# Patient Record
Sex: FEMALE | Race: ASIAN | NOT HISPANIC OR LATINO | URBAN - METROPOLITAN AREA
[De-identification: names, ages, dates, MRNs, and addresses within clinical notes are randomized per-mention and may not be internally consistent; named-entity substitution may affect disease eponyms.]

---

## 2022-09-18 ENCOUNTER — EMERGENCY (EMERGENCY)
Facility: HOSPITAL | Age: 67
LOS: 1 days | Discharge: HOME | End: 2022-09-18
Attending: EMERGENCY MEDICINE
Payer: COMMERCIAL

## 2022-09-18 VITALS
TEMPERATURE: 98 F | SYSTOLIC BLOOD PRESSURE: 126 MMHG | HEIGHT: 65.5 IN | OXYGEN SATURATION: 98 % | RESPIRATION RATE: 16 BRPM | WEIGHT: 179.9 LBS | HEART RATE: 74 BPM | DIASTOLIC BLOOD PRESSURE: 79 MMHG

## 2022-09-18 VITALS
DIASTOLIC BLOOD PRESSURE: 79 MMHG | OXYGEN SATURATION: 97 % | RESPIRATION RATE: 18 BRPM | TEMPERATURE: 98 F | HEART RATE: 64 BPM | SYSTOLIC BLOOD PRESSURE: 121 MMHG

## 2022-09-18 LAB
ALBUMIN SERPL ELPH-MCNC: 3.9 G/DL — SIGNIFICANT CHANGE UP (ref 3.3–5)
ALP SERPL-CCNC: 89 U/L — SIGNIFICANT CHANGE UP (ref 40–120)
ALT FLD-CCNC: 18 U/L — SIGNIFICANT CHANGE UP (ref 10–45)
ANION GAP SERPL CALC-SCNC: 12 MMOL/L — SIGNIFICANT CHANGE UP (ref 5–17)
AST SERPL-CCNC: 26 U/L — SIGNIFICANT CHANGE UP (ref 10–40)
BASOPHILS # BLD AUTO: 0.02 K/UL — SIGNIFICANT CHANGE UP (ref 0–0.2)
BASOPHILS NFR BLD AUTO: 0.2 % — SIGNIFICANT CHANGE UP (ref 0–2)
BILIRUB SERPL-MCNC: 0.6 MG/DL — SIGNIFICANT CHANGE UP (ref 0.2–1.2)
BUN SERPL-MCNC: 15 MG/DL — SIGNIFICANT CHANGE UP (ref 7–23)
CALCIUM SERPL-MCNC: 9.8 MG/DL — SIGNIFICANT CHANGE UP (ref 8.4–10.5)
CHLORIDE SERPL-SCNC: 105 MMOL/L — SIGNIFICANT CHANGE UP (ref 96–108)
CO2 SERPL-SCNC: 23 MMOL/L — SIGNIFICANT CHANGE UP (ref 22–31)
CREAT SERPL-MCNC: 0.64 MG/DL — SIGNIFICANT CHANGE UP (ref 0.5–1.3)
CRP SERPL-MCNC: 12 MG/L — HIGH (ref 0–4)
EGFR: 96 ML/MIN/1.73M2 — SIGNIFICANT CHANGE UP
EOSINOPHIL # BLD AUTO: 1.35 K/UL — HIGH (ref 0–0.5)
EOSINOPHIL NFR BLD AUTO: 15.3 % — HIGH (ref 0–6)
ERYTHROCYTE [SEDIMENTATION RATE] IN BLOOD: 58 MM/HR — HIGH (ref 0–20)
GLUCOSE SERPL-MCNC: 74 MG/DL — SIGNIFICANT CHANGE UP (ref 70–99)
HCT VFR BLD CALC: 42.8 % — SIGNIFICANT CHANGE UP (ref 34.5–45)
HGB BLD-MCNC: 13.4 G/DL — SIGNIFICANT CHANGE UP (ref 11.5–15.5)
IMM GRANULOCYTES NFR BLD AUTO: 0.5 % — SIGNIFICANT CHANGE UP (ref 0–0.9)
LYMPHOCYTES # BLD AUTO: 2.15 K/UL — SIGNIFICANT CHANGE UP (ref 1–3.3)
LYMPHOCYTES # BLD AUTO: 24.3 % — SIGNIFICANT CHANGE UP (ref 13–44)
MCHC RBC-ENTMCNC: 26.4 PG — LOW (ref 27–34)
MCHC RBC-ENTMCNC: 31.3 GM/DL — LOW (ref 32–36)
MCV RBC AUTO: 84.4 FL — SIGNIFICANT CHANGE UP (ref 80–100)
MONOCYTES # BLD AUTO: 0.68 K/UL — SIGNIFICANT CHANGE UP (ref 0–0.9)
MONOCYTES NFR BLD AUTO: 7.7 % — SIGNIFICANT CHANGE UP (ref 2–14)
NEUTROPHILS # BLD AUTO: 4.61 K/UL — SIGNIFICANT CHANGE UP (ref 1.8–7.4)
NEUTROPHILS NFR BLD AUTO: 52 % — SIGNIFICANT CHANGE UP (ref 43–77)
NRBC # BLD: 0 /100 WBCS — SIGNIFICANT CHANGE UP (ref 0–0)
PLATELET # BLD AUTO: 235 K/UL — SIGNIFICANT CHANGE UP (ref 150–400)
POTASSIUM SERPL-MCNC: 5.1 MMOL/L — SIGNIFICANT CHANGE UP (ref 3.5–5.3)
POTASSIUM SERPL-SCNC: 5.1 MMOL/L — SIGNIFICANT CHANGE UP (ref 3.5–5.3)
PROT SERPL-MCNC: 7.2 G/DL — SIGNIFICANT CHANGE UP (ref 6–8.3)
RBC # BLD: 5.07 M/UL — SIGNIFICANT CHANGE UP (ref 3.8–5.2)
RBC # FLD: 12.5 % — SIGNIFICANT CHANGE UP (ref 10.3–14.5)
SARS-COV-2 RNA SPEC QL NAA+PROBE: SIGNIFICANT CHANGE UP
SODIUM SERPL-SCNC: 140 MMOL/L — SIGNIFICANT CHANGE UP (ref 135–145)
WBC # BLD: 8.85 K/UL — SIGNIFICANT CHANGE UP (ref 3.8–10.5)
WBC # FLD AUTO: 8.85 K/UL — SIGNIFICANT CHANGE UP (ref 3.8–10.5)

## 2022-09-18 PROCEDURE — 85652 RBC SED RATE AUTOMATED: CPT

## 2022-09-18 PROCEDURE — 99284 EMERGENCY DEPT VISIT MOD MDM: CPT | Mod: 25

## 2022-09-18 PROCEDURE — 80053 COMPREHEN METABOLIC PANEL: CPT

## 2022-09-18 PROCEDURE — U0003: CPT

## 2022-09-18 PROCEDURE — U0005: CPT

## 2022-09-18 PROCEDURE — 85025 COMPLETE CBC W/AUTO DIFF WBC: CPT

## 2022-09-18 PROCEDURE — 86140 C-REACTIVE PROTEIN: CPT

## 2022-09-18 PROCEDURE — 36415 COLL VENOUS BLD VENIPUNCTURE: CPT

## 2022-09-18 PROCEDURE — 99284 EMERGENCY DEPT VISIT MOD MDM: CPT

## 2022-09-18 PROCEDURE — 73620 X-RAY EXAM OF FOOT: CPT | Mod: 26,LT

## 2022-09-18 PROCEDURE — 73620 X-RAY EXAM OF FOOT: CPT

## 2022-09-18 RX ORDER — AMPICILLIN SODIUM AND SULBACTAM SODIUM 250; 125 MG/ML; MG/ML
1.5 INJECTION, POWDER, FOR SUSPENSION INTRAMUSCULAR; INTRAVENOUS ONCE
Refills: 0 | Status: DISCONTINUED | OUTPATIENT
Start: 2022-09-18 | End: 2022-09-18

## 2022-09-18 NOTE — ED PROVIDER NOTE - NS ED ROS FT
Gen: Denies fevers  CV: Denies chest pain  Skin: + foot wound  Resp: Denies SOB, cough  Endo: Denies increased urination  GI: Denies nausea, vomiting  Msk: +foot pain  : Denies dysuria  Neuro: Denies LOC  Psych: Denies mood changes  all other ROS negative unless indicated in HPI

## 2022-09-18 NOTE — ED PROVIDER NOTE - CARE PROVIDER_API CALL
Waterhouse, Joseph C (DPM)  Surgery  [address] [place] [address]  Phone: [phone]  Fax: [phone]  Follow Up Time:

## 2022-09-18 NOTE — CONSULT NOTE ADULT - ASSESSMENT
67F presents with left dorsal lateral midfoot wound to subdermis.  - Pt seen and evaluated.  - Afebrile, WBC pending, ESR pending, CRP pending.  - Left Foot X-Ray: No gas, no OM, no foreign body.  - Left foot dorsal lateral midfoot wound to subdermis, no drainage, no pus, no excess warmth, no erythema, periwound discoloration, no clinical SOI. Right foot no wounds, no clinical SOI.  - Due to no clinical SOI, a left foot culture is not indicated.  - If WBC is WNL, pt may be discharged on PO antibiotics (recommend PO doxycycline and PO augmentin).  - If WBC is elevated, pt may be placed in CDU for IV antibiotics (recommend IV unasyn).  - Pt may followup with Dr. Joseph Waterhouse ([phone]) within 1 week.  - Discussed with attending. 67F presents with left dorsal lateral midfoot wound to subdermis.  - Pt seen and evaluated.  - Afebrile, WBC 8.85, ESR pending, CRP pending.  - Left Foot X-Ray: No gas, no OM, no foreign body.  - Left foot dorsal lateral midfoot wound to subdermis, no drainage, no pus, no excess warmth, no erythema, periwound discoloration, no clinical SOI. Right foot no wounds, no clinical SOI.  - Due to no clinical SOI, a left foot culture is not indicated.  - If WBC is WNL, pt may be discharged on her current PO antibiotics (recommend continuing with PO doxycycline and adding PO keflex).  - Pt may followup with Dr. Joseph Waterhouse ([phone]) within 1 week.  - Discussed with attending. 67F presents with left dorsal lateral midfoot wound to subdermis.  - Pt seen and evaluated.  - Afebrile, WBC 8.85, ESR pending, CRP pending.  - Left Foot X-Ray: No gas, no OM, no foreign body.  - Left foot dorsal lateral midfoot wound to subdermis, no drainage, no pus, no excess warmth, no erythema, periwound discoloration, no clinical SOI. Right foot no wounds, no clinical SOI.  - Due to no clinical SOI, a left foot culture is not indicated.  - Pt may be discharged on her current PO antibiotics (recommend continuing with PO doxycycline and adding PO keflex).  - Pt may followup with Dr. Joseph Waterhouse ([phone]) within 1 week.  - Discussed with attending. 67F presents with left dorsal lateral midfoot wound to subdermis.  - Pt seen and evaluated.  - Afebrile, WBC 8.85, ESR pending, CRP pending.  - Left Foot X-Ray: No gas, no OM, no foreign body.  - Left foot dorsal lateral midfoot wound to subdermis, no drainage, no pus, no excess warmth, no erythema, periwound discoloration, no clinical SOI. Right foot no wounds, no clinical SOI.  - Due to no clinical SOI, a left foot culture is not indicated.  - Pt may be discharged on her current PO antibiotics (recommend continuing with PO doxycycline and adding PO keflex).  - Pt may followup with Dr. Joseph Waterhouse ([phone]) within 1 week (recommend Friday 9/23).  - Discussed with attending.

## 2022-09-18 NOTE — ED PROVIDER NOTE - NSFOLLOWUPINSTRUCTIONS_ED_ALL_ED_FT
No signs of emergency medical condition on today's workup.  Presumptive diagnosis made, but further evaluation may be required by your primary care doctor or specialist for a definitive diagnosis.  Therefore, follow up as directed and if symptoms change/worsen or any emergency conditions, please return to the ER.    Please follow up with podiatrist given.       A diabetic foot ulcer can be redness over a bony area or an open sore. The ulcer can develop anywhere on your foot or toes. Ulcers usually develop on the bottom of the foot. You may not know you have an ulcer until you notice drainage on your sock. Drainage is fluid that may be yellow, brown, or red. The fluid may also contain pus or blood.  Foot Ulcers         DISCHARGE INSTRUCTIONS:    Call your local emergency number (911 in the ) if:   •You have a fever with chills.      •You begin vomiting.      •You feel faint or become confused.      Seek care immediately if:   •You see new drainage on your sock.      •Your foot becomes red, warm, and swollen.      •Your foot ulcer has a bad smell or is draining pus.      •You feel pain in a foot that used to have little or no feeling.      •You see black or dead tissue in or around the ulcer.      Call your diabetes care provider or foot specialist if:   •The ulcer becomes bigger, deeper, or does not heal.      •You have questions or concerns about your condition or care.      Medicines:   •Antibiotics may be given to help treat or prevent a bacterial infection.      •Take your medicine as directed. Contact your healthcare provider if you think your medicine is not helping or if you have side effects. Tell your provider if you are allergic to any medicine. Keep a list of the medicines, vitamins, and herbs you take. Include the amounts, and when and why you take them. Bring the list or the pill bottles to follow-up visits. Carry your medicine list with you in case of an emergency.      Care for the ulcer as directed: A bandage will be put on the ulcer. Your healthcare provider will give you instructions on changing your bandage. You may need to clean the ulcer and change the bandage daily. The bandage may contain medicines to help the ulcer heal. You may be asked to put medicine on the ulcer before putting on the bandage. The medicine may also prevent growth of tissue that is not healthy. You may need to cover the ulcer with a plastic bag while you bathe. Ask your healthcare provider for instructions on bathing until your foot heals.    Prevent diabetic foot ulcers: Good foot care may help prevent ulcers, or keep them from getting worse. Ask someone to help you if you are not able to check your feet by yourself. You or another person may need to do any of the following:  •Keep your blood sugar levels under control. Continue the plan for your diabetes that you and your healthcare provider have discussed. Healthy food choices and taking your medicines as directed may help control blood sugars. Contact your healthcare provider if your blood sugar levels are higher than directed.      •Wash your feet each day with soap and warm water. Do not use hot water, because this can injure your foot. Dry your feet gently with a towel after you wash them. Dry between and under your toes.      •Apply lotion or a moisturizer on your dry feet. Ask your healthcare provider what lotions are best to use. Do not put lotion or moisturizer between your toes. Moisture between your toes could lead to skin breakdown.      •Check your feet each day. Look at your whole foot, including the bottom, and between and under your toes. Check for wounds, corns, and calluses. Feel your feet by running your hands along the tops, bottoms, sides, and between your toes. Use a nonbreakable mirror to check your feet if you have trouble seeing the bottoms. Do not try to remove corns or calluses yourself. File or cut your toenails straight across.  Diabetic Foot Care           •Protect your feet. Do not walk barefoot or wear your shoes without socks. Check your shoes for rocks or other objects that can hurt your feet. Wear cotton socks to help keep your feet dry. Wear socks without toe seams, or wear them with the seams inside out. Change your socks each day. Do not wear socks that are dirty or damp.      •Wear shoes that fit well. Wear shoes that do not rub against any area of your feet. Your shoes should be ½ to ¾ inch (1 to 2 centimeters) longer than your feet. Your shoes should also have extra space around the widest part of your feet. Walking or athletic shoes with laces or straps that adjust are best. Ask your healthcare provider for help to choose shoes that fit you best. Ask him or her if you need to wear an insert, orthotic, or bandage on your feet.  Properly Fitting Shoes           •Do not smoke. Nicotine can cause damage to your blood vessels and increases your risk for foot ulcers. Do not use e-cigarettes or smokeless tobacco in place of cigarettes or to help you quit. They still contain nicotine. Ask your healthcare provider for information if you currently smoke and need help quitting.      •Know the risks if you choose to drink alcohol. Alcohol can cause your blood sugar levels to be low if you use insulin. Alcohol can cause high blood sugar levels and weight gain if you drink too much. A drink of alcohol is 12 ounces of beer, 5 ounces of wine, or 1½ ounces of liquor.      •Maintain a healthy weight. Ask your healthcare provider what a healthy weight is for you. A healthy weight can help you control your diabetes. Ask him or her to help you create a weight loss plan if needed. Even a 10 to 15 pound weight loss can help you manage your blood sugar level.      Follow up with your diabetes care provider or foot specialist as directed: You may need to return often to have the ulcer checked. The ulcer may be measured to see if it is getting smaller. Bring any offloading devices or footwear to your follow-up visits so your provider or specialist can check them. Write down your questions so you remember to ask them during your visits

## 2022-09-18 NOTE — CONSULT NOTE ADULT - SUBJECTIVE AND OBJECTIVE BOX
Patient is a 67y old  Female who presents with a chief complaint of left foot wound.    HPI: 67 F PMH DMII, HLD, Hypothyroid presents to ED with worsening Lt foot infection. Pt noticed "a bite" 1 month ago that she was scratching at, ignored it until 1 week ago when it became red and painful with central ulceration. Went to PCP and was started on Keflex, took for 5 days and was not seeing improvement. Then went to urgent care and was started on doxycylcine. Now taking both abx. Urgent care told her if she noticed any increased redness after starting abx to go to ED. Pt woke this morning and felt the redness was worse. She has intermittent pain in the area that is not related to walking. tenderness with direct pressure. Central ulceration no longer draining any fluid. Denies fever, HA, CP,SOB, wound drainage, numbness of feet.      PAST MEDICAL & SURGICAL HISTORY:  HLD (hyperlipidemia)      DM (diabetes mellitus)      Hypothyroid      No significant past surgical history          MEDICATIONS  (STANDING):    MEDICATIONS  (PRN):      Allergies    No Known Allergies    Intolerances        VITALS:    Vital Signs Last 24 Hrs  T(C): 36.9 (18 Sep 2022 11:42), Max: 36.9 (18 Sep 2022 11:42)  T(F): 98.4 (18 Sep 2022 11:42), Max: 98.4 (18 Sep 2022 11:42)  HR: 74 (18 Sep 2022 11:42) (74 - 74)  BP: 126/79 (18 Sep 2022 11:42) (126/79 - 126/79)  BP(mean): --  RR: 16 (18 Sep 2022 11:42) (16 - 16)  SpO2: 98% (18 Sep 2022 11:42) (98% - 98%)    Parameters below as of 18 Sep 2022 11:42  Patient On (Oxygen Delivery Method): room air        LABS:                CAPILLARY BLOOD GLUCOSE              LOWER EXTREMITY PHYSICAL EXAM:    Vascular: DP/PT 2/4, B/L, CFT <3 seconds B/L, Temperature gradient warm to cool, B/L.   Neuro: Epicritic sensation intact to the level of toes, B/L.  Musculoskeletal/Ortho: Unremarkable  Skin: Left foot dorsal lateral midfoot wound to subdermis, no drainage, no pus, no excess warmth, no erythema, periwound discoloration, no clinical SOI. Right foot no wounds, no clinical SOI.      RADIOLOGY & ADDITIONAL STUDIES:

## 2022-09-18 NOTE — ED PROVIDER NOTE - PROGRESS NOTE DETAILS
Garrett Alfred MD, PGY1  Consulted podiatry. They will come see patient. Garrett Alfred MD, PGY1  Podiatry consult: recommend keep on oral antibiotics. No need for IV abx. Will follow up with them. Garrett Alfred MD, PGY1  Labs normal. Will dc home with podiatry follow up. continue abx as prescribed.

## 2022-09-18 NOTE — ED PROVIDER NOTE - CLINICAL SUMMARY MEDICAL DECISION MAKING FREE TEXT BOX
67 F PMH DM presenting with no healing foot ulceration and redness x 1 month. On Keflex x 3 days and Doxy x 1 without any improvement. Probably bug bite that was infected when scratching. No systemic sx. Neurovascularly intact on exam with area of central ulceration and surrounding darker pigmentation, crusted over. Will get labs, xray foot, abx IV, podiatry consult. Reassess. 67 F PMH DM presenting with ulceration to dorsal surface L foot initially present as "bite" ~1 month ago and ulcerated ~1 week ago. On Keflex x 3 days and Doxy x 1 without any signif improvement. Possibly insect bite that was infected when scratching. No systemic sx. ie fever, chills. Neurovascularly intact on exam with area of central ulceration and surrounding darker pigmentation w dry scaly skin. No warmthor drainage. NO signif TTP. Will get labs, xray foot, abx IV, podiatry consult. Reassess, poss home after dose of IV w good FU vs CDU for addtl IV abx.

## 2022-09-18 NOTE — ED PROVIDER NOTE - PHYSICAL EXAMINATION
Gen: NAD  HEENT: EOMI, no nasal discharge, mucous membranes moist  CV: RRR, +S1/S2, no M/R/G, 2+ radial pulses b/l  Resp: CTAB, no W/R/R, no accessory muscle use, no increased work of breathing  GI: Abdomen soft non-distended, NTTP  Skin: 1 cm central ulceration not active draining over dorsum of left foot with surrounding erythema that is mildly tender to palpation. Neurovascularly   Neuro: A&Ox4, following commands, moving all four extremities spontaneously  Psych: appropriate mood Gen: NAD  HEENT: EOMI, no nasal discharge, mucous membranes moist  CV: RRR, +S1/S2, no M/R/G, 2+ radial pulses b/l  Resp: CTAB, no W/R/R, no accessory muscle use, no increased work of breathing  GI: Abdomen soft non-distended, NTTP  Skin: 1 cm central ulceration not active draining over dorsum of left foot with surrounding erythema that is mildly tender to palpation. Neurovascularly intact.   Neuro: moving all four extremities spontaneously  Psych: appropriate mood

## 2022-09-18 NOTE — ED ADULT NURSE NOTE - OBJECTIVE STATEMENT
As per Pt, regular diet & thin liquids
67 year old female c/o left foot wound x1 month. Pt A+Ox3, reports visiting PCP last week and starting Keflex, worsening this week and started Doxycycline from Urgent Care. Pt reports redness around wound is spreading, bringing her to the ER. Upon assessment, pt presents with left foot dorsal diabetic ulcer. Pt able to ambulate appropriately. Pt also c/o lower abdominal cramping and headache for a few days. Denies dizziness, chest pain, SOB, N/V, urinary or bowel symptoms, fevers or chills.

## 2022-09-18 NOTE — ED PROVIDER NOTE - OBJECTIVE STATEMENT
67 F PMH DMII, HLD, Hypothyroid presents to ED with worsening Lt foot infection. Pt noticed "a bite" 1 month ago that she was scratching at, ignored it until 1 week ago when it became red and painful with central ulceration. Went to PCP and was started on Keflex, took for 5 days and was not seeing improvement. Then went to urgent care and was started on doxycylcine. Now taking both abx. Urgent care told her if she noticed any increased redness after starting abx to go to ED. Pt woke this morning and felt the redness was worse. She has intermittent pain in the area that is not related to walking. tenderness with direct pressure. Central ulceration no longer draining any fluid. Denies fever, HA, CP,SOB, wound drainage, numbness of feet.

## 2023-05-12 ENCOUNTER — INPATIENT (INPATIENT)
Facility: HOSPITAL | Age: 68
LOS: 1 days | Discharge: HOME | DRG: 287 | End: 2023-05-14
Attending: HOSPITALIST | Admitting: STUDENT IN AN ORGANIZED HEALTH CARE EDUCATION/TRAINING PROGRAM
Payer: COMMERCIAL

## 2023-05-12 VITALS
OXYGEN SATURATION: 99 % | WEIGHT: 177.91 LBS | SYSTOLIC BLOOD PRESSURE: 147 MMHG | RESPIRATION RATE: 16 BRPM | TEMPERATURE: 98 F | HEART RATE: 67 BPM | DIASTOLIC BLOOD PRESSURE: 85 MMHG | HEIGHT: 60 IN

## 2023-05-12 PROCEDURE — 99285 EMERGENCY DEPT VISIT HI MDM: CPT

## 2023-05-12 NOTE — ED ADULT TRIAGE NOTE - CHIEF COMPLAINT QUOTE
chest pain x3 days, worsening tonight. Pt had abnormal stress test 2 weeks ago and is scheduled for a cath on thursday but pain is worsening and radiating to the back

## 2023-05-13 DIAGNOSIS — Z29.9 ENCOUNTER FOR PROPHYLACTIC MEASURES, UNSPECIFIED: ICD-10-CM

## 2023-05-13 DIAGNOSIS — E78.5 HYPERLIPIDEMIA, UNSPECIFIED: ICD-10-CM

## 2023-05-13 DIAGNOSIS — E03.9 HYPOTHYROIDISM, UNSPECIFIED: ICD-10-CM

## 2023-05-13 DIAGNOSIS — I10 ESSENTIAL (PRIMARY) HYPERTENSION: ICD-10-CM

## 2023-05-13 DIAGNOSIS — R07.9 CHEST PAIN, UNSPECIFIED: ICD-10-CM

## 2023-05-13 DIAGNOSIS — E11.9 TYPE 2 DIABETES MELLITUS WITHOUT COMPLICATIONS: ICD-10-CM

## 2023-05-13 DIAGNOSIS — I20.0 UNSTABLE ANGINA: ICD-10-CM

## 2023-05-13 LAB
ALBUMIN SERPL ELPH-MCNC: 4.7 G/DL — SIGNIFICANT CHANGE UP (ref 3.3–5)
ALP SERPL-CCNC: 75 U/L — SIGNIFICANT CHANGE UP (ref 40–120)
ALT FLD-CCNC: 18 U/L — SIGNIFICANT CHANGE UP (ref 10–45)
ANION GAP SERPL CALC-SCNC: 11 MMOL/L — SIGNIFICANT CHANGE UP (ref 5–17)
APTT BLD: 35.6 SEC — HIGH (ref 27.5–35.5)
AST SERPL-CCNC: 20 U/L — SIGNIFICANT CHANGE UP (ref 10–40)
BASOPHILS # BLD AUTO: 0.03 K/UL — SIGNIFICANT CHANGE UP (ref 0–0.2)
BASOPHILS NFR BLD AUTO: 0.4 % — SIGNIFICANT CHANGE UP (ref 0–2)
BILIRUB SERPL-MCNC: 0.5 MG/DL — SIGNIFICANT CHANGE UP (ref 0.2–1.2)
BUN SERPL-MCNC: 14 MG/DL — SIGNIFICANT CHANGE UP (ref 7–23)
CALCIUM SERPL-MCNC: 9.5 MG/DL — SIGNIFICANT CHANGE UP (ref 8.4–10.5)
CHLORIDE SERPL-SCNC: 106 MMOL/L — SIGNIFICANT CHANGE UP (ref 96–108)
CO2 SERPL-SCNC: 24 MMOL/L — SIGNIFICANT CHANGE UP (ref 22–31)
CREAT SERPL-MCNC: 0.7 MG/DL — SIGNIFICANT CHANGE UP (ref 0.5–1.3)
EGFR: 93 ML/MIN/1.73M2 — SIGNIFICANT CHANGE UP
EOSINOPHIL # BLD AUTO: 0.35 K/UL — SIGNIFICANT CHANGE UP (ref 0–0.5)
EOSINOPHIL NFR BLD AUTO: 4.1 % — SIGNIFICANT CHANGE UP (ref 0–6)
GLUCOSE BLDC GLUCOMTR-MCNC: 111 MG/DL — HIGH (ref 70–99)
GLUCOSE BLDC GLUCOMTR-MCNC: 136 MG/DL — HIGH (ref 70–99)
GLUCOSE SERPL-MCNC: 83 MG/DL — SIGNIFICANT CHANGE UP (ref 70–99)
HCT VFR BLD CALC: 40.4 % — SIGNIFICANT CHANGE UP (ref 34.5–45)
HGB BLD-MCNC: 12.4 G/DL — SIGNIFICANT CHANGE UP (ref 11.5–15.5)
IMM GRANULOCYTES NFR BLD AUTO: 0.4 % — SIGNIFICANT CHANGE UP (ref 0–0.9)
INR BLD: 0.94 RATIO — SIGNIFICANT CHANGE UP (ref 0.88–1.16)
LYMPHOCYTES # BLD AUTO: 3.21 K/UL — SIGNIFICANT CHANGE UP (ref 1–3.3)
LYMPHOCYTES # BLD AUTO: 37.6 % — SIGNIFICANT CHANGE UP (ref 13–44)
MAGNESIUM SERPL-MCNC: 2.1 MG/DL — SIGNIFICANT CHANGE UP (ref 1.6–2.6)
MCHC RBC-ENTMCNC: 26.2 PG — LOW (ref 27–34)
MCHC RBC-ENTMCNC: 30.7 GM/DL — LOW (ref 32–36)
MCV RBC AUTO: 85.4 FL — SIGNIFICANT CHANGE UP (ref 80–100)
MONOCYTES # BLD AUTO: 0.54 K/UL — SIGNIFICANT CHANGE UP (ref 0–0.9)
MONOCYTES NFR BLD AUTO: 6.3 % — SIGNIFICANT CHANGE UP (ref 2–14)
NEUTROPHILS # BLD AUTO: 4.38 K/UL — SIGNIFICANT CHANGE UP (ref 1.8–7.4)
NEUTROPHILS NFR BLD AUTO: 51.2 % — SIGNIFICANT CHANGE UP (ref 43–77)
NRBC # BLD: 0 /100 WBCS — SIGNIFICANT CHANGE UP (ref 0–0)
NT-PROBNP SERPL-SCNC: 46 PG/ML — SIGNIFICANT CHANGE UP (ref 0–300)
PLATELET # BLD AUTO: 214 K/UL — SIGNIFICANT CHANGE UP (ref 150–400)
POTASSIUM SERPL-MCNC: 4.1 MMOL/L — SIGNIFICANT CHANGE UP (ref 3.5–5.3)
POTASSIUM SERPL-SCNC: 4.1 MMOL/L — SIGNIFICANT CHANGE UP (ref 3.5–5.3)
PROT SERPL-MCNC: 7.6 G/DL — SIGNIFICANT CHANGE UP (ref 6–8.3)
PROTHROM AB SERPL-ACNC: 10.8 SEC — SIGNIFICANT CHANGE UP (ref 10.5–13.4)
RBC # BLD: 4.73 M/UL — SIGNIFICANT CHANGE UP (ref 3.8–5.2)
RBC # FLD: 13.3 % — SIGNIFICANT CHANGE UP (ref 10.3–14.5)
SODIUM SERPL-SCNC: 141 MMOL/L — SIGNIFICANT CHANGE UP (ref 135–145)
TROPONIN T, HIGH SENSITIVITY RESULT: 6 NG/L — SIGNIFICANT CHANGE UP (ref 0–51)
WBC # BLD: 8.54 K/UL — SIGNIFICANT CHANGE UP (ref 3.8–10.5)
WBC # FLD AUTO: 8.54 K/UL — SIGNIFICANT CHANGE UP (ref 3.8–10.5)

## 2023-05-13 PROCEDURE — 93010 ELECTROCARDIOGRAM REPORT: CPT

## 2023-05-13 PROCEDURE — 71046 X-RAY EXAM CHEST 2 VIEWS: CPT | Mod: 26

## 2023-05-13 PROCEDURE — 99233 SBSQ HOSP IP/OBS HIGH 50: CPT

## 2023-05-13 RX ORDER — ASPIRIN/CALCIUM CARB/MAGNESIUM 324 MG
243 TABLET ORAL ONCE
Refills: 0 | Status: COMPLETED | OUTPATIENT
Start: 2023-05-13 | End: 2023-05-13

## 2023-05-13 RX ORDER — SODIUM CHLORIDE 9 MG/ML
1000 INJECTION, SOLUTION INTRAVENOUS
Refills: 0 | Status: DISCONTINUED | OUTPATIENT
Start: 2023-05-13 | End: 2023-05-14

## 2023-05-13 RX ORDER — SIMVASTATIN 20 MG/1
1 TABLET, FILM COATED ORAL
Refills: 0 | DISCHARGE

## 2023-05-13 RX ORDER — ACETAMINOPHEN 500 MG
975 TABLET ORAL ONCE
Refills: 0 | Status: COMPLETED | OUTPATIENT
Start: 2023-05-13 | End: 2023-05-13

## 2023-05-13 RX ORDER — LEVOTHYROXINE SODIUM 125 MCG
1 TABLET ORAL
Refills: 0 | DISCHARGE

## 2023-05-13 RX ORDER — GLUCAGON INJECTION, SOLUTION 0.5 MG/.1ML
1 INJECTION, SOLUTION SUBCUTANEOUS ONCE
Refills: 0 | Status: DISCONTINUED | OUTPATIENT
Start: 2023-05-13 | End: 2023-05-14

## 2023-05-13 RX ORDER — POLYETHYLENE GLYCOL 3350 17 G/17G
17 POWDER, FOR SOLUTION ORAL DAILY
Refills: 0 | Status: DISCONTINUED | OUTPATIENT
Start: 2023-05-13 | End: 2023-05-14

## 2023-05-13 RX ORDER — ASPIRIN/CALCIUM CARB/MAGNESIUM 324 MG
81 TABLET ORAL DAILY
Refills: 0 | Status: DISCONTINUED | OUTPATIENT
Start: 2023-05-13 | End: 2023-05-14

## 2023-05-13 RX ORDER — GLUCOSAMINE HCL/CHONDROITIN SU 500-400 MG
1 CAPSULE ORAL
Refills: 0 | DISCHARGE

## 2023-05-13 RX ORDER — ASPIRIN/CALCIUM CARB/MAGNESIUM 324 MG
1 TABLET ORAL
Refills: 0 | DISCHARGE

## 2023-05-13 RX ORDER — INSULIN LISPRO 100/ML
VIAL (ML) SUBCUTANEOUS
Refills: 0 | Status: DISCONTINUED | OUTPATIENT
Start: 2023-05-13 | End: 2023-05-14

## 2023-05-13 RX ORDER — DEXTROSE 50 % IN WATER 50 %
25 SYRINGE (ML) INTRAVENOUS ONCE
Refills: 0 | Status: DISCONTINUED | OUTPATIENT
Start: 2023-05-13 | End: 2023-05-14

## 2023-05-13 RX ORDER — INSULIN LISPRO 100/ML
VIAL (ML) SUBCUTANEOUS AT BEDTIME
Refills: 0 | Status: DISCONTINUED | OUTPATIENT
Start: 2023-05-13 | End: 2023-05-14

## 2023-05-13 RX ORDER — LEVOTHYROXINE SODIUM 125 MCG
25 TABLET ORAL DAILY
Refills: 0 | Status: DISCONTINUED | OUTPATIENT
Start: 2023-05-13 | End: 2023-05-14

## 2023-05-13 RX ORDER — PIOGLITAZONE HYDROCHLORIDE 15 MG/1
1 TABLET ORAL
Refills: 0 | DISCHARGE

## 2023-05-13 RX ORDER — SIMVASTATIN 20 MG/1
10 TABLET, FILM COATED ORAL AT BEDTIME
Refills: 0 | Status: DISCONTINUED | OUTPATIENT
Start: 2023-05-13 | End: 2023-05-14

## 2023-05-13 RX ORDER — ACETAMINOPHEN 500 MG
650 TABLET ORAL EVERY 6 HOURS
Refills: 0 | Status: DISCONTINUED | OUTPATIENT
Start: 2023-05-13 | End: 2023-05-14

## 2023-05-13 RX ORDER — ENOXAPARIN SODIUM 100 MG/ML
40 INJECTION SUBCUTANEOUS EVERY 24 HOURS
Refills: 0 | Status: DISCONTINUED | OUTPATIENT
Start: 2023-05-13 | End: 2023-05-14

## 2023-05-13 RX ORDER — LIDOCAINE 4 G/100G
1 CREAM TOPICAL ONCE
Refills: 0 | Status: COMPLETED | OUTPATIENT
Start: 2023-05-13 | End: 2023-05-13

## 2023-05-13 RX ORDER — METOPROLOL TARTRATE 50 MG
25 TABLET ORAL DAILY
Refills: 0 | Status: DISCONTINUED | OUTPATIENT
Start: 2023-05-13 | End: 2023-05-14

## 2023-05-13 RX ORDER — METOPROLOL TARTRATE 50 MG
1 TABLET ORAL
Refills: 0 | DISCHARGE

## 2023-05-13 RX ORDER — CYCLOSPORINE 0.5 MG/ML
1 EMULSION OPHTHALMIC
Refills: 0 | DISCHARGE

## 2023-05-13 RX ORDER — DEXTROSE 50 % IN WATER 50 %
15 SYRINGE (ML) INTRAVENOUS ONCE
Refills: 0 | Status: DISCONTINUED | OUTPATIENT
Start: 2023-05-13 | End: 2023-05-14

## 2023-05-13 RX ORDER — GLIMEPIRIDE 1 MG
1 TABLET ORAL
Refills: 0 | DISCHARGE

## 2023-05-13 RX ORDER — DEXTROSE 50 % IN WATER 50 %
12.5 SYRINGE (ML) INTRAVENOUS ONCE
Refills: 0 | Status: DISCONTINUED | OUTPATIENT
Start: 2023-05-13 | End: 2023-05-14

## 2023-05-13 RX ADMIN — SIMVASTATIN 10 MILLIGRAM(S): 20 TABLET, FILM COATED ORAL at 21:58

## 2023-05-13 RX ADMIN — Medication 243 MILLIGRAM(S): at 02:12

## 2023-05-13 RX ADMIN — Medication 975 MILLIGRAM(S): at 02:12

## 2023-05-13 RX ADMIN — ENOXAPARIN SODIUM 40 MILLIGRAM(S): 100 INJECTION SUBCUTANEOUS at 21:58

## 2023-05-13 RX ADMIN — Medication 650 MILLIGRAM(S): at 19:43

## 2023-05-13 RX ADMIN — LIDOCAINE 1 PATCH: 4 CREAM TOPICAL at 02:12

## 2023-05-13 NOTE — ED PROVIDER NOTE - PHYSICAL EXAMINATION
GEN: Patient awake alert NAD.   HEENT: normocephalic, atraumatic, moist MM  CARDIAC: RRR, S1, S2, no murmur.   PULM: CTA B/L no wheeze, rhonchi, rales.   ABD: soft NT, ND, no rebound no guarding  MSK: Moving all extremities, no edema. 5/5 strength and full ROM in all extremities, except b/l thoracic paraspinal ttp.     NEURO: A&Ox3, no focal neurological deficits  SKIN: warm, dry, no rash.

## 2023-05-13 NOTE — ED PROVIDER NOTE - OBJECTIVE STATEMENT
68-year-old female past medical history of diabetes, hypertension, hyperlipidemia, hypothyroidism presents ED complaining of 3 days of constant, worsening, nonexertional, nonpositional midsternal chest pain as well as back pain.  Patient had abnormal stress test 2 weeks ago and was scheduled to have cardiac catheterization on Thursday.  Patient takes 81 mg of aspirin daily.  Patient denies radiation, diaphoresis, fever, nausea, vomiting, lightheadedness, dizziness, shortness of breath, abdominal pain, dysuria, diarrhea.

## 2023-05-13 NOTE — ED ADULT NURSE NOTE - NS ED NURSE LEVEL OF CONSCIOUSNESS ORIENTATION
Hypertensive kidney disease with stage 5 chronic kidney disease Oriented - self; Oriented - place; Oriented - time Diabetes Diabetes Hypertensive kidney disease with stage 5 chronic kidney disease Hypertensive kidney disease with stage 5 chronic kidney disease Hypertensive kidney disease with stage 5 chronic kidney disease

## 2023-05-13 NOTE — ED PROVIDER NOTE - CLINICAL SUMMARY MEDICAL DECISION MAKING FREE TEXT BOX
Bashir Dooley,  PGY-2: 68-year-old female past medical history of diabetes, hypertension, hyperlipidemia, hypothyroidism presents ED complaining of 3 days of constant, worsening, nonexertional, nonpositional midsternal chest pain as well as back pain.  Patient had abnormal stress test 2 weeks ago and was scheduled to have cardiac catheterization on Thursday.  Patient takes 81 mg of aspirin daily. Patient with paraspinal tenderness in the thoracic region otherwise nonfocal physical exam, hemodynamically stable.  Differential includes but not limited to MSK back pain, ACS, low suspicion for PE.  Patient is high risk for ACS.  Plan for labs, EKG, chest x-ray, likely admission.

## 2023-05-13 NOTE — ED ADULT NURSE REASSESSMENT NOTE - NS ED NURSE REASSESS COMMENT FT1
0952 Pt awake and provided a sandwich and some juice and also used the bathroom in the room Pending bed assignment Dae

## 2023-05-13 NOTE — ED PROVIDER NOTE - ATTENDING CONTRIBUTION TO CARE
Attending (Kae Colon M.D.):  I have personally seen and examined this patient. I have performed a substantive portion of the visit including all aspects of the medical decision making. Resident and/or ACP note reviewed. I agree on the plan of care except where noted.

## 2023-05-13 NOTE — H&P ADULT - PROBLEM SELECTOR PLAN 1
Pt found to have unstable angina   Positive outpt stress test 4/23  Troponins x2 negative   EKG NSR 67bpm no st/t changes   Check echo   Check tsh, lipid profile, A1C  C/w asprin 81mg qd   C/w toprol xl 25mg qd  C/w zocor 10mg qd  Plan for LHC per cards on monday

## 2023-05-13 NOTE — ED ADULT NURSE NOTE - OBJECTIVE STATEMENT
67 yo female PMH HTN, DM, HLD, hypothyroidism, A&ox3, presents to ED c/o  midsternal nonradiating CP x 3 days, worsening w/ back pain.  PT scheduled for cath on thrusday, unable to tolerate the pain.  Denies SOB.  breathing even and unlabored, abdomen soft nontender, no pedal edema. Pt denies palpitations, shortness of breath, headache, visual disturbances, numbness/tingling, fever, chills, diaphoresis,  nausea, vomiting, constipation, diarrhea, or urinary symptoms.

## 2023-05-13 NOTE — PATIENT PROFILE ADULT - NSPROHMDIABETMGMTSTRAT_GEN_A_NUR
activity/adequate rest/coping strategies/diet modification/exercise/medication therapy/routine screenings/weight management
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
## 2023-05-13 NOTE — ED PROVIDER NOTE - NS ED ROS FT
GENERAL: No fever, chills  EYES: no vision changes, no discharge.   ENT: no difficulty swallowing or speaking   CARDIAC: + chest pain/pressure, no SOB, lower extremity swelling  PULMONARY: no cough, SOB  GI: no abdominal pain, n/v/d  : no dysuria, no hematuria  SKIN: no rashes, no ecchymosis  NEURO: no headache, lightheadedness  MSK: No joint pain, +myalgia, no weakness.

## 2023-05-13 NOTE — ED ADULT NURSE NOTE - DRUG PRE-SCREENING (DAST -1)

## 2023-05-13 NOTE — H&P ADULT - ASSESSMENT
68F hx DM2, HTN, HLD, hypothyroid presents with constant substernal/midsternal chest pain for 3 days. Found to have unstable angina

## 2023-05-13 NOTE — CONSULT NOTE ADULT - SUBJECTIVE AND OBJECTIVE BOX
DATE OF SERVICE: 05-13-23 @ 15:49    CHIEF COMPLAINT:Patient is a 68y old  Female who presents with a chief complaint of chest pain     HISTORY OF PRESENT ILLNESS:HPI:68-year-old female past medical history of diabetes, hypertension, hyperlipidemia, hypothyroidism presents ED complaining of 3 days of constant, worsening, nonexertional, nonpositional midsternal chest pain as well as back pain.  Patient had abnormal stress test 2 weeks ago and was scheduled to have cardiac catheterization on Thursday.  Patient takes 81 mg of aspirin daily.  Patient denies radiation, diaphoresis, fever, nausea, vomiting, lightheadedness, dizziness, shortness of breath, abdominal pain, dysuria, diarrhea      PAST MEDICAL & SURGICAL HISTORY:  HLD (hyperlipidemia)      DM (diabetes mellitus)      Hypothyroid      No significant past surgical history              MEDICATIONS:                  FAMILY HISTORY:      Non-contributory    SOCIAL HISTORY:    [ ] Tobacco  [ ] Drugs  [ ] Alcohol    Allergies    No Known Allergies    Intolerances    	    REVIEW OF SYSTEMS:  CONSTITUTIONAL: No fever  EYES: No eye pain, visual disturbances, or discharge  ENMT:  No difficulty hearing, tinnitus  NECK: No pain or stiffness  RESPIRATORY: No cough, wheezing,  CARDIOVASCULAR: No chest pain, palpitations, passing out, dizziness, or leg swelling  GASTROINTESTINAL:  No nausea, vomiting, diarrhea or constipation. No melena.  GENITOURINARY: No dysuria, hematuria  NEUROLOGICAL: No stroke like symptoms  SKIN: No burning or lesions   ENDOCRINE: No heat or cold intolerance  MUSCULOSKELETAL: No joint pain or swelling  PSYCHIATRIC: No  anxiety, mood swings  HEME/LYMPH: No bleeding gums  ALLERGY AND IMMUNOLOGIC: No hives or eczema	    All other ROS negative    PHYSICAL EXAM:  T(C): 36.9 (05-13-23 @ 15:15), Max: 36.9 (05-12-23 @ 22:34)  HR: 53 (05-13-23 @ 15:15) (53 - 68)  BP: 112/59 (05-13-23 @ 15:15) (105/69 - 154/74)  RR: 18 (05-13-23 @ 15:15) (13 - 22)  SpO2: 98% (05-13-23 @ 15:15) (98% - 100%)  Wt(kg): --  I&O's Summary      Appearance: Normal	  HEENT:   Normal oral mucosa, EOMI	  Cardiovascular:  S1 S2, No JVD,    Respiratory: Lungs clear to auscultation	  Psychiatry: Alert  Gastrointestinal:  Soft, Non-tender, + BS	  Skin: No rashes   Neurologic: Non-focal  Extremities:  No edema  Vascular: Peripheral pulses palpable    	    	  	  CARDIAC MARKERS:  Labs personally reviewed by me                                  12.4   8.54  )-----------( 214      ( 13 May 2023 02:12 )             40.4     05-13    141  |  106  |  14  ----------------------------<  83  4.1   |  24  |  0.70    Ca    9.5      13 May 2023 02:12  Mg     2.1     05-13    TPro  7.6  /  Alb  4.7  /  TBili  0.5  /  DBili  x   /  AST  20  /  ALT  18  /  AlkPhos  75  05-13          EKG: Personally reviewed by me - NSR no ischemic changes   Radiology: Personally reviewed by me - < from: Xray Chest 2 Views PA/Lat (05.13.23 @ 02:06) >    Clear lungs.    < end of copied text >        Assessment /Plan:   68-year-old female past medical history of diabetes, hypertension, hyperlipidemia, hypothyroidism presents ED complaining of 3 days of constant, worsening, nonexertional, nonpositional midsternal chest pain as well as back pain.  Patient had abnormal stress test 2 weeks ago and was scheduled to have cardiac catheterization on Thursday.     Problem/Plan-1:   Problem: Unstable angina   Plan: [facility] likely Monday   Obtain TTE   EKG NSR no ischemia noted  Trop wnl x2   Asa 81mg   SL NTG 0.4 PRN for chest pain    Problem/Plan-2:  Problem: Hypertension  Plan: At goal     Problem/Plan-3:   Problem: HLD  Plan: F/u lipid panel    Problem/Plan- 4:   Problem: Hypothyroidism  Plan: C/w home synthroid  f/u TSH      Differential diagnosis and plan of care discussed with patient after the evaluation. Counseling on diet, nutritional counseling, weight management, exercise and medication compliance was done.   Advanced care planning/advanced directives discussed with patient/family. DNR status including forceful chest compressions to attempt to restart the heart, ventilator support/artificial breathing, electric shock, artificial nutrition, health care proxy, Molst form all discussed with pt. Pt wishes to consider. More than fifteen minutes spent on discussing advanced directives.     TouhidVAL Maldonado DO [facility]  Cardiovascular Medicine  [address] [place] [address], [address]  Office [phone]  Available via call or text on Microsoft Teams

## 2023-05-13 NOTE — ED ADULT NURSE NOTE - NSFALLUNIVINTERV_ED_ALL_ED
Bed/Stretcher in lowest position, wheels locked, appropriate side rails in place/Call bell, personal items and telephone in reach/Instruct patient to call for assistance before getting out of bed/chair/stretcher/Non-slip footwear applied when patient is off stretcher/[place] to call system/Physically safe environment - no spills, clutter or unnecessary equipment/Purposeful proactive rounding/Room/bathroom lighting operational, light cord in reach

## 2023-05-14 VITALS
RESPIRATION RATE: 18 BRPM | TEMPERATURE: 98 F | SYSTOLIC BLOOD PRESSURE: 119 MMHG | DIASTOLIC BLOOD PRESSURE: 70 MMHG | HEART RATE: 57 BPM

## 2023-05-14 LAB
A1C WITH ESTIMATED AVERAGE GLUCOSE RESULT: 7 % — HIGH (ref 4–5.6)
ALBUMIN SERPL ELPH-MCNC: 4 G/DL — SIGNIFICANT CHANGE UP (ref 3.3–5)
ALP SERPL-CCNC: 67 U/L — SIGNIFICANT CHANGE UP (ref 40–120)
ALT FLD-CCNC: 15 U/L — SIGNIFICANT CHANGE UP (ref 10–45)
ANION GAP SERPL CALC-SCNC: 11 MMOL/L — SIGNIFICANT CHANGE UP (ref 5–17)
AST SERPL-CCNC: 17 U/L — SIGNIFICANT CHANGE UP (ref 10–40)
BILIRUB SERPL-MCNC: 0.7 MG/DL — SIGNIFICANT CHANGE UP (ref 0.2–1.2)
BUN SERPL-MCNC: 14 MG/DL — SIGNIFICANT CHANGE UP (ref 7–23)
CALCIUM SERPL-MCNC: 9.4 MG/DL — SIGNIFICANT CHANGE UP (ref 8.4–10.5)
CHLORIDE SERPL-SCNC: 105 MMOL/L — SIGNIFICANT CHANGE UP (ref 96–108)
CHOLEST SERPL-MCNC: 113 MG/DL — SIGNIFICANT CHANGE UP
CO2 SERPL-SCNC: 25 MMOL/L — SIGNIFICANT CHANGE UP (ref 22–31)
CREAT SERPL-MCNC: 0.74 MG/DL — SIGNIFICANT CHANGE UP (ref 0.5–1.3)
EGFR: 87 ML/MIN/1.73M2 — SIGNIFICANT CHANGE UP
ESTIMATED AVERAGE GLUCOSE: 154 MG/DL — HIGH (ref 68–114)
GLUCOSE BLDC GLUCOMTR-MCNC: 112 MG/DL — HIGH (ref 70–99)
GLUCOSE BLDC GLUCOMTR-MCNC: 124 MG/DL — HIGH (ref 70–99)
GLUCOSE SERPL-MCNC: 104 MG/DL — HIGH (ref 70–99)
HCT VFR BLD CALC: 41.4 % — SIGNIFICANT CHANGE UP (ref 34.5–45)
HCV AB S/CO SERPL IA: 0.14 S/CO — SIGNIFICANT CHANGE UP (ref 0–0.99)
HCV AB SERPL-IMP: SIGNIFICANT CHANGE UP
HDLC SERPL-MCNC: 47 MG/DL — LOW
HGB BLD-MCNC: 13 G/DL — SIGNIFICANT CHANGE UP (ref 11.5–15.5)
LIPID PNL WITH DIRECT LDL SERPL: 48 MG/DL — SIGNIFICANT CHANGE UP
MAGNESIUM SERPL-MCNC: 1.8 MG/DL — SIGNIFICANT CHANGE UP (ref 1.6–2.6)
MCHC RBC-ENTMCNC: 26.5 PG — LOW (ref 27–34)
MCHC RBC-ENTMCNC: 31.4 GM/DL — LOW (ref 32–36)
MCV RBC AUTO: 84.5 FL — SIGNIFICANT CHANGE UP (ref 80–100)
NON HDL CHOLESTEROL: 66 MG/DL — SIGNIFICANT CHANGE UP
NRBC # BLD: 0 /100 WBCS — SIGNIFICANT CHANGE UP (ref 0–0)
PHOSPHATE SERPL-MCNC: 3.4 MG/DL — SIGNIFICANT CHANGE UP (ref 2.5–4.5)
PLATELET # BLD AUTO: 195 K/UL — SIGNIFICANT CHANGE UP (ref 150–400)
POTASSIUM SERPL-MCNC: 4.1 MMOL/L — SIGNIFICANT CHANGE UP (ref 3.5–5.3)
POTASSIUM SERPL-SCNC: 4.1 MMOL/L — SIGNIFICANT CHANGE UP (ref 3.5–5.3)
PROT SERPL-MCNC: 6.8 G/DL — SIGNIFICANT CHANGE UP (ref 6–8.3)
RBC # BLD: 4.9 M/UL — SIGNIFICANT CHANGE UP (ref 3.8–5.2)
RBC # FLD: 13.1 % — SIGNIFICANT CHANGE UP (ref 10.3–14.5)
SODIUM SERPL-SCNC: 141 MMOL/L — SIGNIFICANT CHANGE UP (ref 135–145)
TRIGL SERPL-MCNC: 92 MG/DL — SIGNIFICANT CHANGE UP
TSH SERPL-MCNC: 2.29 UIU/ML — SIGNIFICANT CHANGE UP (ref 0.27–4.2)
WBC # BLD: 6.71 K/UL — SIGNIFICANT CHANGE UP (ref 3.8–10.5)
WBC # FLD AUTO: 6.71 K/UL — SIGNIFICANT CHANGE UP (ref 3.8–10.5)

## 2023-05-14 PROCEDURE — 71046 X-RAY EXAM CHEST 2 VIEWS: CPT

## 2023-05-14 PROCEDURE — 80061 LIPID PANEL: CPT

## 2023-05-14 PROCEDURE — 99239 HOSP IP/OBS DSCHRG MGMT >30: CPT

## 2023-05-14 PROCEDURE — C1887: CPT

## 2023-05-14 PROCEDURE — 85027 COMPLETE CBC AUTOMATED: CPT

## 2023-05-14 PROCEDURE — 84443 ASSAY THYROID STIM HORMONE: CPT

## 2023-05-14 PROCEDURE — 83036 HEMOGLOBIN GLYCOSYLATED A1C: CPT

## 2023-05-14 PROCEDURE — 84484 ASSAY OF TROPONIN QUANT: CPT

## 2023-05-14 PROCEDURE — 82962 GLUCOSE BLOOD TEST: CPT

## 2023-05-14 PROCEDURE — 80053 COMPREHEN METABOLIC PANEL: CPT

## 2023-05-14 PROCEDURE — C1894: CPT

## 2023-05-14 PROCEDURE — 85025 COMPLETE CBC W/AUTO DIFF WBC: CPT

## 2023-05-14 PROCEDURE — 85730 THROMBOPLASTIN TIME PARTIAL: CPT

## 2023-05-14 PROCEDURE — 84100 ASSAY OF PHOSPHORUS: CPT

## 2023-05-14 PROCEDURE — 99285 EMERGENCY DEPT VISIT HI MDM: CPT | Mod: 25

## 2023-05-14 PROCEDURE — 86803 HEPATITIS C AB TEST: CPT

## 2023-05-14 PROCEDURE — 83880 ASSAY OF NATRIURETIC PEPTIDE: CPT

## 2023-05-14 PROCEDURE — 85610 PROTHROMBIN TIME: CPT

## 2023-05-14 PROCEDURE — 93005 ELECTROCARDIOGRAM TRACING: CPT

## 2023-05-14 PROCEDURE — 36415 COLL VENOUS BLD VENIPUNCTURE: CPT

## 2023-05-14 PROCEDURE — C1769: CPT

## 2023-05-14 PROCEDURE — 93454 CORONARY ARTERY ANGIO S&I: CPT | Mod: 26

## 2023-05-14 PROCEDURE — 93454 CORONARY ARTERY ANGIO S&I: CPT

## 2023-05-14 PROCEDURE — 83735 ASSAY OF MAGNESIUM: CPT

## 2023-05-14 RX ORDER — ACETAMINOPHEN 500 MG
2 TABLET ORAL
Qty: 0 | Refills: 0 | DISCHARGE
Start: 2023-05-14

## 2023-05-14 RX ORDER — SODIUM CHLORIDE 9 MG/ML
250 INJECTION INTRAMUSCULAR; INTRAVENOUS; SUBCUTANEOUS ONCE
Refills: 0 | Status: COMPLETED | OUTPATIENT
Start: 2023-05-14 | End: 2023-05-14

## 2023-05-14 RX ORDER — SODIUM CHLORIDE 9 MG/ML
1000 INJECTION INTRAMUSCULAR; INTRAVENOUS; SUBCUTANEOUS
Refills: 0 | Status: DISCONTINUED | OUTPATIENT
Start: 2023-05-14 | End: 2023-05-14

## 2023-05-14 RX ADMIN — SODIUM CHLORIDE 75 MILLILITER(S): 9 INJECTION INTRAMUSCULAR; INTRAVENOUS; SUBCUTANEOUS at 07:42

## 2023-05-14 RX ADMIN — POLYETHYLENE GLYCOL 3350 17 GRAM(S): 17 POWDER, FOR SOLUTION ORAL at 11:09

## 2023-05-14 RX ADMIN — SODIUM CHLORIDE 500 MILLILITER(S): 9 INJECTION INTRAMUSCULAR; INTRAVENOUS; SUBCUTANEOUS at 07:41

## 2023-05-14 RX ADMIN — Medication 81 MILLIGRAM(S): at 06:49

## 2023-05-14 RX ADMIN — Medication 25 MILLIGRAM(S): at 06:00

## 2023-05-14 RX ADMIN — Medication 1 TABLET(S): at 11:09

## 2023-05-14 RX ADMIN — Medication 25 MICROGRAM(S): at 06:01

## 2023-05-14 NOTE — DISCHARGE NOTE PROVIDER - PROVIDER TOKENS
PROVIDER:[TOKEN:[[id number]],FOLLOWUP:[1 week]] PROVIDER:[TOKEN:[[id number]],FOLLOWUP:[1 week]],FREE:[LAST:[Dr. Baer],PHONE:[(   )    -],FAX:[(   )    -],FOLLOWUP:[1 week]]

## 2023-05-14 NOTE — DISCHARGE NOTE PROVIDER - NSDCFUADDAPPT_GEN_ALL_CORE_FT
APPTS ARE READY TO BE MADE: [x] YES    Best Family or Patient Contact (if needed):    Additional Information about above appointments (if needed):    1:   2:   3:     Other comments or requests:    APPTS ARE READY TO BE MADE: [x] YES    Best Family or Patient Contact (if needed):    Additional Information about above appointments (if needed):    1:   2:   3:     Other comments or requests:   Pt is poly with Dr. Baer on 5/22 at 9:30am, [place] location.  Pt is awaiting call back from Dr. Dixon for scheduling.

## 2023-05-14 NOTE — DISCHARGE NOTE PROVIDER - CARE PROVIDER_API CALL
Karen Dixon  INTERNAL MEDICINE  [address] [place] [address]  Phone: [phone]  Fax: [phone]  Follow Up Time: 1 week   Karen Dixon  INTERNAL MEDICINE  [address] [place] [address]  Phone: [phone]  Fax: [phone]  Follow Up Time: 1 week    Dr. Baer,   Phone: (   )    -  Fax: (   )    -  Follow Up Time: 1 week

## 2023-05-14 NOTE — DISCHARGE NOTE PROVIDER - HOSPITAL COURSE
68 year old Female PMHx of DM2, HTN, HLD, hypothyroid presented to ED with constant substernal/midsternal chest pain for 3 days, with some pain in her left arm. Patient had abnormal stress test 2 weeks ago and was scheduled to have cardiac catheterization this upcoming week.    Patient was admitted for further medical management. Cardiology was consulted. EKG noted with NSR no ischemia. Trop within normal limits x2 Patient underwent a diagnostic cardiac cath on 5/14/23. Patient advised to follow up outpatient for TTE with private cardiologist.     Discharge/Dispo/Med rec discussed with attending Dr. Martinez. Patient medically cleared for discharge home with outpatient follow up with PCP and cardiologist.

## 2023-05-14 NOTE — DISCHARGE NOTE PROVIDER - NSDCCPCAREPLAN_GEN_ALL_CORE_FT
PRINCIPAL DISCHARGE DIAGNOSIS  Diagnosis: Chest pain  Assessment and Plan of Treatment: You underwent a diagnostic cardiac cath on 5/14/23  Please follow up outpatient with your cardiologist within 1 week from discharge for a Transthoracic Echocardiogram  Call your doctor if you have any new pain, pressure, or discomfort in the center of your chest, pain, tingling or discomfort in arms, back, neck, jaw, or stomach, shortness of breath, nausea, vomiting, burping or heartburn, sweating, cold and clammy skin, racing or abnormal heartbeat for more than 10 minutes or if they keep coming & going.  Call 911 and do not try to get to hospital by car.        SECONDARY DISCHARGE DIAGNOSES  Diagnosis: DM (diabetes mellitus)  Assessment and Plan of Treatment: Make sure you get your HgA1c checked every three months.  If you take oral diabetes medications, check your blood glucose at least two times a day.  If you take short-acting insulin, check your blood glucose before meals and at bedtime.  It's important not to skip any meals.  Keep a log of your blood glucose results and always take it with you to your doctor appointments.  Keep a list of your current medications including over the counter medications and bring this medication list with you to all your doctor appointments.  If you have not seen your ophthalmologist this year, call for appointment.  Check your feet daily for redness, sores, or openings.  Do not self treat.  If there is no improvement in two days, call your primary care physician for an appointment.  HgA1c this admission was 7.0    Diagnosis: Hypertension  Assessment and Plan of Treatment: Continue to follow a low salt/sodium diet.  Perform physical activities as tolerated in consultation with your Primary Care Provider and physical therapist.  Take all medications as prescribed.  Follow up with your medical doctor for routine blood pressure monitoring at your next visit.  Notify your doctor if you have any of the following symptoms:  Dizziness, lightheadedness, blurry vision, headache, chest pain, or shortness of breath.      Diagnosis: Hypothyroid  Assessment and Plan of Treatment: Please continue medications as prescribed   Follow-up with your primary care physician within 1 week. Call for appointment.  Please bring all discharge paperwork and list of medications to all follow up appointments  Please call for follow up appointments one day after discharge      Diagnosis: HLD (hyperlipidemia)  Assessment and Plan of Treatment: Low salt, low fat, low cholesterol, diabetic diet    Continue medication as prescribed  Exercise, increase your activity level

## 2023-05-14 NOTE — PROGRESS NOTE ADULT - SUBJECTIVE AND OBJECTIVE BOX
DATE OF SERVICE: 05-14-23 @ 12:48    Patient is a 68y old  Female who presents with a chief complaint of Chest pain (14 May 2023 12:36)      INTERVAL HISTORY: no complaints     REVIEW OF SYSTEMS:  CONSTITUTIONAL: No weakness  EYES/ENT: No visual changes;  No throat pain   NECK: No pain or stiffness  RESPIRATORY: No cough, wheezing; No shortness of breath  CARDIOVASCULAR: No chest pain or palpitations  GASTROINTESTINAL: No abdominal  pain. No nausea, vomiting, or hematemesis  GENITOURINARY: No dysuria, frequency or hematuria  NEUROLOGICAL: No stroke like symptoms  SKIN: No rashes    	  MEDICATIONS:  metoprolol succinate ER 25 milliGRAM(s) Oral daily        PHYSICAL EXAM:  T(C): 36.4 (05-14-23 @ 09:42), Max: 36.9 (05-13-23 @ 15:15)  HR: 51 (05-14-23 @ 09:42) (47 - 66)  BP: 111/66 (05-14-23 @ 09:42) (111/56 - 156/70)  RR: 16 (05-14-23 @ 09:42) (16 - 18)  SpO2: 98% (05-14-23 @ 09:42) (97% - 100%)  Wt(kg): --  I&O's Summary    13 May 2023 07:01  -  14 May 2023 07:00  --------------------------------------------------------  IN: 120 mL / OUT: 0 mL / NET: 120 mL          Appearance: In no distress	  HEENT:    PERRL, EOMI	  Cardiovascular:  S1 S2, No JVD  Respiratory: Lungs clear to auscultation	  Gastrointestinal:  Soft, Non-tender, + BS	  Vascularature:  No edema of LE  Psychiatric: Appropriate affect   Neuro: no acute focal deficits                               13.0   6.71  )-----------( 195      ( 14 May 2023 06:36 )             41.4     05-14    141  |  105  |  14  ----------------------------<  104<H>  4.1   |  25  |  0.74    Ca    9.4      14 May 2023 06:36  Phos  3.4     05-14  Mg     1.8     05-14    TPro  6.8  /  Alb  4.0  /  TBili  0.7  /  DBili  x   /  AST  17  /  ALT  15  /  AlkPhos  67  05-14        Labs personally reviewed      ASSESSMENT/PLAN: 	  68-year-old female past medical history of diabetes, hypertension, hyperlipidemia, hypothyroidism presents ED complaining of 3 days of constant, worsening, nonexertional, nonpositional midsternal chest pain as well as back pain.  Patient had abnormal stress test 2 weeks ago and was scheduled to have cardiac catheterization on Thursday.     Problem/Plan-1:   Problem: Unstable angina   Plan: C likely Monday   Obtain TTE   EKG NSR no ischemia noted  Trop wnl x2   Asa 81mg   SL NTG 0.4 PRN for chest pain  5/14: s/p LHC 5/14 minimal CAD nonobstructive. Obtain TTE as outpatient in 1 week. Chest discomfort more likely MSK. ok for dc with outpatient f/u     Problem/Plan-2:  Problem: Hypertension  Plan: At goal     Problem/Plan-3:   Problem: HLD  Plan: F/u lipid panel  Lipids at goal 5/14     Problem/Plan- 4:   Problem: Hypothyroidism  Plan: C/w home synthroid  f/u TSH  TSH 2.29 5/14         VAL Huitron DO [facility]  Cardiovascular Medicine  [address] [place] [address], [address]  Office: [phone]  Available via call/text on [employer] Teams 
Patient is a 68y old  Female who presents with a chief complaint of Chest pain (14 May 2023 12:07)      SUBJECTIVE / OVERNIGHT EVENTS: pt feels well, chest pain is better, midsternal area ttp, no sob, dizziness, abdominal pain, chills     MEDICATIONS  (STANDING):  aspirin  chewable 81 milliGRAM(s) Oral daily  dextrose 5%. 1000 milliLiter(s) (50 mL/Hr) IV Continuous <Continuous>  dextrose 5%. 1000 milliLiter(s) (100 mL/Hr) IV Continuous <Continuous>  dextrose 50% Injectable 25 Gram(s) IV Push once  dextrose 50% Injectable 12.5 Gram(s) IV Push once  dextrose 50% Injectable 25 Gram(s) IV Push once  enoxaparin Injectable 40 milliGRAM(s) SubCutaneous every 24 hours  glucagon  Injectable 1 milliGRAM(s) IntraMuscular once  insulin lispro (ADMELOG) corrective regimen sliding scale   SubCutaneous three times a day before meals  insulin lispro (ADMELOG) corrective regimen sliding scale   SubCutaneous at bedtime  levothyroxine 25 MICROGram(s) Oral daily  metoprolol succinate ER 25 milliGRAM(s) Oral daily  multivitamin 1 Tablet(s) Oral daily  polyethylene glycol 3350 17 Gram(s) Oral daily  simvastatin 10 milliGRAM(s) Oral at bedtime  sodium chloride 0.9%. 1000 milliLiter(s) (75 mL/Hr) IV Continuous <Continuous>    MEDICATIONS  (PRN):  acetaminophen     Tablet .. 650 milliGRAM(s) Oral every 6 hours PRN Mild Pain (1 - 3)  dextrose Oral Gel 15 Gram(s) Oral once PRN Blood Glucose LESS THAN 70 milliGRAM(s)/deciliter        CAPILLARY BLOOD GLUCOSE      POCT Blood Glucose.: 124 mg/dL (14 May 2023 12:17)  POCT Blood Glucose.: 112 mg/dL (14 May 2023 10:11)  POCT Blood Glucose.: 111 mg/dL (13 May 2023 21:35)  POCT Blood Glucose.: 136 mg/dL (13 May 2023 17:25)    I&O's Summary    13 May 2023 07:01  -  14 May 2023 07:00  --------------------------------------------------------  IN: 120 mL / OUT: 0 mL / NET: 120 mL        PHYSICAL EXAM:  GENERAL: NAD, well-developed  HEAD:  Atraumatic, Normocephalic  EYES: EOMI, PERRLA, conjunctiva and sclera clear  NECK: Supple, No JVD  CHEST/LUNG: Clear to auscultation bilaterally; No wheeze  HEART: Regular rate and rhythm; No murmurs, rubs, or gallops  ABDOMEN: Soft, Nontender, Nondistended; Bowel sounds present  EXTREMITIES:  2+ Peripheral Pulses, No clubbing, cyanosis, or edema  PSYCH: AAOx3      LABS:                        13.0   6.71  )-----------( 195      ( 14 May 2023 06:36 )             41.4     05-14    141  |  105  |  14  ----------------------------<  104<H>  4.1   |  25  |  0.74    Ca    9.4      14 May 2023 06:36  Phos  3.4     05-14  Mg     1.8     05-14    TPro  6.8  /  Alb  4.0  /  TBili  0.7  /  DBili  x   /  AST  17  /  ALT  15  /  AlkPhos  67  05-14    PT/INR - ( 13 May 2023 02:12 )   PT: 10.8 sec;   INR: 0.94 ratio         PTT - ( 13 May 2023 02:12 )  PTT:35.6 sec          RADIOLOGY & ADDITIONAL TESTS:    Imaging Personally Reviewed:    Consultant(s) Notes Reviewed:      Care Discussed with Consultants/Other Providers:

## 2023-05-14 NOTE — DISCHARGE NOTE NURSING/CASE MANAGEMENT/SOCIAL WORK - PATIENT PORTAL LINK FT
You can access the FollowMyHealth Patient Portal offered by [facility] by registering at the following website: [email or web address]. By joining [facility]’s FollowMyHealth portal, you will also be able to view your health information using other applications (apps) compatible with our system.

## 2023-05-14 NOTE — CHART NOTE - NSCHARTNOTEFT_GEN_A_CORE
Request from Dr. Martinez to facilitate patient discharge. Medication reconciliation reviewed, revised, and resolved with Dr. Martinez who had medically cleared patient for discharge with follow-up as advised. Please refer to discharge note for detailed hospital course. Patient is currently stable for discharge to home at this time.    Sarah Lombardi PA-C  Dept of Medicine   Contact #[phone]

## 2023-05-14 NOTE — PROGRESS NOTE ADULT - PROBLEM SELECTOR PLAN 1
Pt found to have unstable angina   Positive outpt stress test 4/23  Troponins x2 negative   EKG NSR 67bpm no st/t changes    Tsh 2.29  Lipid profile wnl  A1C 7  C/w asprin 81mg qd   C/w toprol xl 25mg qd  C/w zocor 10mg qd  S/p LHC 5/14 d/w cardiology. Cath with mild disease, pt cleared for discharge by cards with outpt follow up with pt's cardiologist Dr. Dumont. Advised pt to follow up with cardiology next week and to repeat echo as outpt

## 2023-05-14 NOTE — DISCHARGE NOTE NURSING/CASE MANAGEMENT/SOCIAL WORK - NSDCPEFALRISK_GEN_ALL_CORE
For information on Fall & Injury Prevention, visit: [email or web address] OR  [email or web address] OR  https://www.cdc.gov/steadi/patient.html

## 2023-05-14 NOTE — DISCHARGE NOTE PROVIDER - NSDCMRMEDTOKEN_GEN_ALL_CORE_FT
acetaminophen 325 mg oral tablet: 2 tab(s) orally every 6 hours As needed Mild Pain (1 - 3)  aspirin 81 mg oral tablet: 1 tab(s) orally once a day  Daily Jesica oral tablet: 1 tab(s) orally once a day  glimepiride 4 mg oral tablet: 1 tab(s) orally once a day  Osteo Bi-Flex 250 mg-200 mg oral tablet: 1 tab(s) orally 2 times a day  pioglitazone 15 mg oral tablet: 1 tab(s) orally once a day  Restasis 0.05% ophthalmic emulsion: 1 drop(s) in each affected eye 2 times a day  Synthroid 25 mcg (0.025 mg) oral tablet: 1 tab(s) orally once a day  Toprol-XL 25 mg oral tablet, extended release: 1 tab(s) orally once a day  vitamin b-12 1,000: 1 tab once a day  vitamin C 1,000: 1 tab once a day  vitamin D3 25mc tab once a day  Zocor 10 mg oral tablet: 1 tab(s) orally once a day

## 2023-05-15 LAB — GLUCOSE BLDC GLUCOMTR-MCNC: 113 MG/DL — HIGH (ref 70–99)
